# Patient Record
Sex: FEMALE
[De-identification: names, ages, dates, MRNs, and addresses within clinical notes are randomized per-mention and may not be internally consistent; named-entity substitution may affect disease eponyms.]

---

## 2022-06-19 ENCOUNTER — NURSE TRIAGE (OUTPATIENT)
Dept: OTHER | Facility: CLINIC | Age: 67
End: 2022-06-19

## 2022-06-19 NOTE — TELEPHONE ENCOUNTER
Subjective: Caller states \"Abdominal Pain\"     Current Symptoms:   Left sided abdominal pain- at the level of the umbilicus  Pain worse with standing and walking  Denies N/V/D/Constipation/Urinary s/sx  Fatigue    Hx IBS but states this pain is different than her usual pain. Onset: Yesterday    Pain Severity: 2/10; dull; constant    Temperature: Denies    What has been tried: Pepcid    Recommended disposition: See HCP within 4 Hours (or PCP triage)    Care advice provided, patient verbalizes understanding; denies any other questions or concerns; instructed to call back for any new or worsening symptoms. Patient/caller agrees to proceed to nearest [facility]     This triage is a result of a call to [address] [place] [address]. Please do not respond to the triage nurse through this encounter. Any subsequent communication should be directly with the patient.     Reason for Disposition   [1] MILD-MODERATE pain AND [2] constant AND [3] present > 2 hours    Protocols used: ABDOMINAL PAIN - [facility] - SHEMAR CATALAN

## 2023-07-28 ENCOUNTER — NURSE TRIAGE (OUTPATIENT)
Dept: OTHER | Facility: CLINIC | Age: 68
End: 2023-07-28

## 2023-07-28 NOTE — TELEPHONE ENCOUNTER
Pt called from [place]. Call transferred to Pavan Dickey RN to complete triage    Reason for Disposition   Caller has already spoken with another triager or PCP (or office), and has further questions and triager able to answer questions.     Protocols used: No Contact or Duplicate Contact Call-ADULT-[place]

## 2023-07-28 NOTE — TELEPHONE ENCOUNTER
Location of patient: [place]    Subjective: Caller states \"I'm on an Anti malaria pill x 2 weeks and I'm having severe nausea\"     Current Symptoms:   Malarone rx to be started yesterday (pt traveling today) x 1 week while there and 1 week after getting home    Pt has taken 1 dose at 9pm last night, better than it was  Pt was advised to drink water, take with fatty foods. Nausea, feeling unwell, reduced activity. Inquiring about what to do should nausea continue. Hesitant to take next dose this evening. [facility] rx medication. Onset: Nausea began this morning, mildly improved    Associated Symptoms: reduced activity    Pain Severity: 0/10    Temperature: Denies    What has been tried: Took with food, hydration, pepto    LMP: NA Pregnant: NA    Recommended disposition:  Pt advised to call [facility] to discuss symptoms with prescribing provider. Care advice provided, patient verbalizes understanding; denies any other questions or concerns; instructed to call back for any new or worsening symptoms. Pt agreeable to follow-up with prescribing clinic. This triage is a result of a call to [address] [place] [address]. Please do not respond to the triage nurse through this encounter. Any subsequent communication should be directly with the patient.   Reason for Disposition   Caller has medication question about med NOT prescribed by PCP and triager unable to answer question (e.g., compatibility with other med, storage)    Protocols used: Medication Question Call-ADULT-[place]

## 2023-08-14 ENCOUNTER — OFFICE VISIT (OUTPATIENT)
Dept: PRIMARY CARE | Facility: CLINIC | Age: 68
End: 2023-08-14
Payer: COMMERCIAL

## 2023-08-14 VITALS
WEIGHT: 129 LBS | DIASTOLIC BLOOD PRESSURE: 68 MMHG | TEMPERATURE: 98.3 F | SYSTOLIC BLOOD PRESSURE: 118 MMHG | BODY MASS INDEX: 19.55 KG/M2 | HEART RATE: 82 BPM | OXYGEN SATURATION: 98 % | HEIGHT: 68 IN

## 2023-08-14 DIAGNOSIS — E78.00 HYPERCHOLESTEREMIA: Primary | ICD-10-CM

## 2023-08-14 DIAGNOSIS — I48.91 ATRIAL FIBRILLATION, CONTROLLED (MULTI): ICD-10-CM

## 2023-08-14 DIAGNOSIS — R73.03 PREDIABETES: ICD-10-CM

## 2023-08-14 DIAGNOSIS — N18.31 STAGE 3A CHRONIC KIDNEY DISEASE (MULTI): ICD-10-CM

## 2023-08-14 PROBLEM — A60.00 GENITAL HERPES: Status: ACTIVE | Noted: 2023-08-14

## 2023-08-14 PROBLEM — H52.13 HIGH MYOPIA, BILATERAL: Status: ACTIVE | Noted: 2023-08-14

## 2023-08-14 PROBLEM — N95.2 ATROPHIC VAGINITIS: Status: ACTIVE | Noted: 2023-08-14

## 2023-08-14 PROBLEM — N18.9 CKD (CHRONIC KIDNEY DISEASE): Status: ACTIVE | Noted: 2023-08-14

## 2023-08-14 PROBLEM — H40.003 GLAUCOMA SUSPECT OF BOTH EYES: Status: ACTIVE | Noted: 2023-08-14

## 2023-08-14 PROBLEM — H25.11 AGE-RELATED NUCLEAR CATARACT OF RIGHT EYE: Status: ACTIVE | Noted: 2023-08-14

## 2023-08-14 PROBLEM — H47.20 BILATERAL OPTIC NERVE ATROPHY: Status: ACTIVE | Noted: 2023-08-14

## 2023-08-14 PROBLEM — F41.9 ANXIETY: Status: ACTIVE | Noted: 2023-08-14

## 2023-08-14 PROBLEM — D64.9 ANEMIA: Status: ACTIVE | Noted: 2023-08-14

## 2023-08-14 PROBLEM — K58.9 IRRITABLE BOWEL SYNDROME: Status: ACTIVE | Noted: 2023-08-14

## 2023-08-14 PROBLEM — M19.90 ARTHRITIS: Status: ACTIVE | Noted: 2023-08-14

## 2023-08-14 PROBLEM — E55.9 VITAMIN D DEFICIENCY: Status: ACTIVE | Noted: 2023-08-14

## 2023-08-14 PROBLEM — E53.8 B12 DEFICIENCY: Status: ACTIVE | Noted: 2023-08-14

## 2023-08-14 PROBLEM — H25.12 AGE-RELATED NUCLEAR CATARACT OF LEFT EYE: Status: ACTIVE | Noted: 2023-08-14

## 2023-08-14 PROCEDURE — 99214 OFFICE O/P EST MOD 30 MIN: CPT | Performed by: FAMILY MEDICINE

## 2023-08-14 PROCEDURE — 1036F TOBACCO NON-USER: CPT | Performed by: FAMILY MEDICINE

## 2023-08-14 PROCEDURE — 1160F RVW MEDS BY RX/DR IN RCRD: CPT | Performed by: FAMILY MEDICINE

## 2023-08-14 PROCEDURE — 1159F MED LIST DOCD IN RCRD: CPT | Performed by: FAMILY MEDICINE

## 2023-08-14 RX ORDER — DICYCLOMINE HYDROCHLORIDE 10 MG/1
CAPSULE ORAL
COMMUNITY
Start: 2022-07-19

## 2023-08-14 RX ORDER — AMITRIPTYLINE HYDROCHLORIDE 10 MG/1
1 TABLET, FILM COATED ORAL NIGHTLY
Qty: 30 TABLET | Refills: 2 | COMMUNITY
Start: 2023-07-14 | End: 2024-04-26 | Stop reason: SINTOL

## 2023-08-14 RX ORDER — ESTRADIOL 0.1 MG/G
CREAM VAGINAL
COMMUNITY

## 2023-08-14 RX ORDER — DILTIAZEM HYDROCHLORIDE 240 MG/1
240 CAPSULE, COATED, EXTENDED RELEASE ORAL DAILY
COMMUNITY
Start: 2023-05-22

## 2023-08-14 RX ORDER — RIVAROXABAN 20 MG/1
20 TABLET, FILM COATED ORAL
COMMUNITY

## 2023-08-14 RX ORDER — ACYCLOVIR 400 MG/1
TABLET ORAL
COMMUNITY
Start: 2018-01-24

## 2023-08-14 RX ORDER — ROSUVASTATIN CALCIUM 10 MG/1
10 TABLET, COATED ORAL DAILY
COMMUNITY

## 2023-08-14 RX ORDER — LATANOPROST 50 UG/ML
SOLUTION/ DROPS OPHTHALMIC
COMMUNITY

## 2023-08-14 RX ORDER — FAMOTIDINE 20 MG/1
1 TABLET, FILM COATED ORAL 2 TIMES DAILY
COMMUNITY
Start: 2022-06-03

## 2023-08-14 RX ORDER — HYOSCYAMINE SULFATE 0.12 MG/1
0.12 TABLET SUBLINGUAL EVERY 6 HOURS PRN
COMMUNITY
Start: 2023-07-14

## 2023-08-14 ASSESSMENT — PATIENT HEALTH QUESTIONNAIRE - PHQ9
1. LITTLE INTEREST OR PLEASURE IN DOING THINGS: NOT AT ALL
2. FEELING DOWN, DEPRESSED OR HOPELESS: NOT AT ALL
SUM OF ALL RESPONSES TO PHQ9 QUESTIONS 1 AND 2: 0

## 2023-08-14 NOTE — PROGRESS NOTES
Subjective   Patient ID: Emma Bansal is a 68 y.o. female who presents for Follow-up (6 month).    HPI   Follow-up multiple medical problems  ** care team **  She follows with many specialists most at [facility] [place]  OB/GYN -Dr. Osborn on imvexxy for dryness - helping   GI Dr. Quiroz for her IBS and colonoscopies -done 10/22, getting on elavil  has been very helpful in the past for IBS  Nephrology - was seen for CKD- told to follow up with PCP- if stable no need for further follow up  neurology- Dr. Barraza - [facility]-following for benign meningioma  Cardio-Dr. Armstrong(main)/ Dr. Barrera (EP)- [facility]- for a fib/flutter- well controlled/ rate controlled on xarelto  Ophtho- Dr. Whatley- [facility]- glaucoma specialist  urology- trace hematuria- went to urology- told will always have trace blood while on xarelto but is not problematic no further follow up or work up   Oculoplastics- had lid surgery       Cold sx since returning from [place] 1 week ago   Covid neg x 2  Started with fevers  Sinus congestion 'better today actually     a fib s/p cardioversion 2019- following with [facility]  had Ziopatch- no issues by pt report  Occasional palpitations but otherwise doing well- never fully goes into a fib anymore   no bleeding issues on xarelto      Hyperlipidemiaâ€“tolerating statin well without any issues  No chest pain confirms breath, nausea, vomiting, headaches, dizziness  due for labs      CKD3- labs due       prediabetes- stable on recent labs. tends toward sugar   eating overall healthy but does tend toward carbs when IBS flares   exercising regularly     mild anemia and low wbc in past- per pt always this way- sister and dad same  no bleeding issues   is on xarelto           HM  s/p hysterectomy benign reasons   colonoscopy done 10/22- due 10/27  mammogram due 8/23    Review of Systems   All other systems reviewed and are negative.      Objective   /68 (BP Location: Right arm, Patient Position: Sitting)   Pulse 82   Temp  "36.8 °C (98.3 °F) (Temporal)   Ht 1.715 m (5' 7.5\")   Wt 58.5 kg (129 lb)   SpO2 98%   BMI 19.91 kg/m²     Physical Exam  Vitals and nursing note reviewed.   Constitutional:       Appearance: Normal appearance.   HENT:      Head: Normocephalic and atraumatic.      Right Ear: Tympanic membrane, ear canal and external ear normal.      Left Ear: Tympanic membrane, ear canal and external ear normal.      Nose: Nose normal.      Mouth/Throat:      Mouth: Mucous membranes are moist.      Pharynx: Oropharynx is clear.   Eyes:      Extraocular Movements: Extraocular movements intact.      Conjunctiva/sclera: Conjunctivae normal.      Pupils: Pupils are equal, round, and reactive to light.   Cardiovascular:      Rate and Rhythm: Normal rate and regular rhythm.      Pulses: Normal pulses.      Heart sounds: Normal heart sounds. No murmur heard.     No friction rub. No gallop.   Pulmonary:      Effort: Pulmonary effort is normal. No respiratory distress.      Breath sounds: Normal breath sounds.   Abdominal:      General: Abdomen is flat. Bowel sounds are normal.      Palpations: Abdomen is soft. There is no mass.      Tenderness: There is no abdominal tenderness. There is no rebound.   Musculoskeletal:         General: No swelling or deformity. Normal range of motion.      Cervical back: Normal range of motion and neck supple.   Lymphadenopathy:      Cervical: No cervical adenopathy.   Skin:     General: Skin is warm and dry.      Capillary Refill: Capillary refill takes less than 2 seconds.      Findings: No rash.   Neurological:      General: No focal deficit present.      Mental Status: She is alert and oriented to person, place, and time. Mental status is at baseline.      Cranial Nerves: No cranial nerve deficit.      Gait: Gait normal.      Deep Tendon Reflexes: Reflexes normal.   Psychiatric:         Mood and Affect: Mood normal.         Behavior: Behavior normal.         Thought Content: Thought content normal.    "      Judgment: Judgment normal.         Assessment/Plan   1. a fib- well controlled. no bleeding issues. following closely with cardio. no sx   2. meningioma- stable on mri- following yearly with neruo. no sx. f/u mri by neuro  3. hyperlipidemia- - tolerating statin. no issues. Labs due   4. Prediabetes-continue work on healthy diet and exercise and recheck in 6 months  5. Ckd =stable labs due     Uri improving call inb           -PT verbalized understanding of plan of care and all questions were answered    Randy Sauer, DO

## 2023-11-09 ENCOUNTER — TELEPHONE (OUTPATIENT)
Dept: PRIMARY CARE | Facility: CLINIC | Age: 68
End: 2023-11-09
Payer: COMMERCIAL

## 2023-11-09 DIAGNOSIS — Z71.84 TRAVEL ADVICE ENCOUNTER: Primary | ICD-10-CM

## 2023-11-09 NOTE — TELEPHONE ENCOUNTER
Patient will be traveling out of the country soon and would like to know if she could get a preventative rx for travelers diarrhea?     Karl romero

## 2023-11-10 RX ORDER — AZITHROMYCIN 500 MG/1
500 TABLET, FILM COATED ORAL DAILY
Qty: 3 TABLET | Refills: 0 | Status: SHIPPED | OUTPATIENT
Start: 2023-11-10 | End: 2023-11-13

## 2023-11-10 NOTE — TELEPHONE ENCOUNTER
Can she gregoria azithromycin? That is what we use and she has erythromycin as an allergy? Limited with her allergies

## 2024-02-19 ENCOUNTER — TELEPHONE (OUTPATIENT)
Dept: PRIMARY CARE | Facility: CLINIC | Age: 69
End: 2024-02-19
Payer: COMMERCIAL

## 2024-02-19 NOTE — TELEPHONE ENCOUNTER
Patient had to reschedule appt, she's now scheduled for 4/26. She wasn't sure if she's due to have labs prior to that appt. Please advise

## 2024-02-23 ENCOUNTER — APPOINTMENT (OUTPATIENT)
Dept: PRIMARY CARE | Facility: CLINIC | Age: 69
End: 2024-02-23
Payer: COMMERCIAL

## 2024-04-23 ENCOUNTER — LAB (OUTPATIENT)
Dept: LAB | Facility: LAB | Age: 69
End: 2024-04-23
Payer: COMMERCIAL

## 2024-04-23 DIAGNOSIS — E78.00 HYPERCHOLESTEREMIA: ICD-10-CM

## 2024-04-23 DIAGNOSIS — N18.31 STAGE 3A CHRONIC KIDNEY DISEASE (MULTI): ICD-10-CM

## 2024-04-23 DIAGNOSIS — R73.03 PREDIABETES: ICD-10-CM

## 2024-04-23 DIAGNOSIS — I48.91 ATRIAL FIBRILLATION, CONTROLLED (MULTI): ICD-10-CM

## 2024-04-23 LAB
ALBUMIN SERPL BCP-MCNC: 4 G/DL (ref 3.4–5)
ALP SERPL-CCNC: 59 U/L (ref 33–136)
ALT SERPL W P-5'-P-CCNC: 12 U/L (ref 7–45)
ANION GAP SERPL CALC-SCNC: 13 MMOL/L (ref 10–20)
AST SERPL W P-5'-P-CCNC: 18 U/L (ref 9–39)
BASOPHILS # BLD AUTO: 0.01 X10*3/UL (ref 0–0.1)
BASOPHILS NFR BLD AUTO: 0.2 %
BILIRUB SERPL-MCNC: 0.4 MG/DL (ref 0–1.2)
BUN SERPL-MCNC: 16 MG/DL (ref 6–23)
CALCIUM SERPL-MCNC: 8.9 MG/DL (ref 8.6–10.3)
CHLORIDE SERPL-SCNC: 105 MMOL/L (ref 98–107)
CHOLEST SERPL-MCNC: 186 MG/DL (ref 0–199)
CHOLESTEROL/HDL RATIO: 2.1
CO2 SERPL-SCNC: 27 MMOL/L (ref 21–32)
CREAT SERPL-MCNC: 0.95 MG/DL (ref 0.5–1.05)
EGFRCR SERPLBLD CKD-EPI 2021: 65 ML/MIN/1.73M*2
EOSINOPHIL # BLD AUTO: 0 X10*3/UL (ref 0–0.7)
EOSINOPHIL NFR BLD AUTO: 0 %
ERYTHROCYTE [DISTWIDTH] IN BLOOD BY AUTOMATED COUNT: 13.2 % (ref 11.5–14.5)
GLUCOSE SERPL-MCNC: 78 MG/DL (ref 74–99)
HCT VFR BLD AUTO: 38.3 % (ref 36–46)
HDLC SERPL-MCNC: 87.6 MG/DL
HGB BLD-MCNC: 12.1 G/DL (ref 12–16)
IMM GRANULOCYTES # BLD AUTO: 0 X10*3/UL (ref 0–0.7)
IMM GRANULOCYTES NFR BLD AUTO: 0 % (ref 0–0.9)
LDLC SERPL CALC-MCNC: 86 MG/DL
LYMPHOCYTES # BLD AUTO: 1.47 X10*3/UL (ref 1.2–4.8)
LYMPHOCYTES NFR BLD AUTO: 28.9 %
MCH RBC QN AUTO: 29.6 PG (ref 26–34)
MCHC RBC AUTO-ENTMCNC: 31.6 G/DL (ref 32–36)
MCV RBC AUTO: 94 FL (ref 80–100)
MONOCYTES # BLD AUTO: 0.61 X10*3/UL (ref 0.1–1)
MONOCYTES NFR BLD AUTO: 12 %
NEUTROPHILS # BLD AUTO: 2.99 X10*3/UL (ref 1.2–7.7)
NEUTROPHILS NFR BLD AUTO: 58.9 %
NON HDL CHOLESTEROL: 98 MG/DL (ref 0–149)
NRBC BLD-RTO: 0 /100 WBCS (ref 0–0)
PLATELET # BLD AUTO: 235 X10*3/UL (ref 150–450)
POTASSIUM SERPL-SCNC: 4.5 MMOL/L (ref 3.5–5.3)
PROT SERPL-MCNC: 6.6 G/DL (ref 6.4–8.2)
RBC # BLD AUTO: 4.09 X10*6/UL (ref 4–5.2)
SODIUM SERPL-SCNC: 140 MMOL/L (ref 136–145)
TRIGL SERPL-MCNC: 64 MG/DL (ref 0–149)
VLDL: 13 MG/DL (ref 0–40)
WBC # BLD AUTO: 5.1 X10*3/UL (ref 4.4–11.3)

## 2024-04-23 PROCEDURE — 83036 HEMOGLOBIN GLYCOSYLATED A1C: CPT

## 2024-04-23 PROCEDURE — 80053 COMPREHEN METABOLIC PANEL: CPT

## 2024-04-23 PROCEDURE — 85025 COMPLETE CBC W/AUTO DIFF WBC: CPT

## 2024-04-23 PROCEDURE — 80061 LIPID PANEL: CPT

## 2024-04-23 PROCEDURE — 36415 COLL VENOUS BLD VENIPUNCTURE: CPT

## 2024-04-24 LAB
EST. AVERAGE GLUCOSE BLD GHB EST-MCNC: 123 MG/DL
HBA1C MFR BLD: 5.9 %

## 2024-04-26 ENCOUNTER — OFFICE VISIT (OUTPATIENT)
Dept: PRIMARY CARE | Facility: CLINIC | Age: 69
End: 2024-04-26
Payer: COMMERCIAL

## 2024-04-26 VITALS
DIASTOLIC BLOOD PRESSURE: 60 MMHG | BODY MASS INDEX: 20.77 KG/M2 | WEIGHT: 134.6 LBS | SYSTOLIC BLOOD PRESSURE: 96 MMHG | TEMPERATURE: 98 F

## 2024-04-26 DIAGNOSIS — D32.9 BENIGN NEOPLASM OF MENINGES, UNSPECIFIED (MULTI): Primary | ICD-10-CM

## 2024-04-26 DIAGNOSIS — R73.03 PREDIABETES: ICD-10-CM

## 2024-04-26 DIAGNOSIS — I48.91 ATRIAL FIBRILLATION, CONTROLLED (MULTI): ICD-10-CM

## 2024-04-26 DIAGNOSIS — E78.00 HYPERCHOLESTEREMIA: ICD-10-CM

## 2024-04-26 DIAGNOSIS — N18.31 STAGE 3A CHRONIC KIDNEY DISEASE (MULTI): ICD-10-CM

## 2024-04-26 PROCEDURE — 1160F RVW MEDS BY RX/DR IN RCRD: CPT | Performed by: FAMILY MEDICINE

## 2024-04-26 PROCEDURE — 1159F MED LIST DOCD IN RCRD: CPT | Performed by: FAMILY MEDICINE

## 2024-04-26 PROCEDURE — 99214 OFFICE O/P EST MOD 30 MIN: CPT | Performed by: FAMILY MEDICINE

## 2024-04-26 PROCEDURE — 1036F TOBACCO NON-USER: CPT | Performed by: FAMILY MEDICINE

## 2024-04-26 RX ORDER — CHOLECALCIFEROL (VITAMIN D3) 50 MCG
2000 TABLET ORAL
COMMUNITY

## 2024-04-26 RX ORDER — LANOLIN ALCOHOL/MO/W.PET/CERES
2500 CREAM (GRAM) TOPICAL
COMMUNITY

## 2024-04-26 RX ORDER — TIMOLOL MALEATE 5 MG/ML
1 SOLUTION/ DROPS OPHTHALMIC
COMMUNITY
Start: 2024-03-26 | End: 2024-06-24

## 2024-04-26 ASSESSMENT — PATIENT HEALTH QUESTIONNAIRE - PHQ9
2. FEELING DOWN, DEPRESSED OR HOPELESS: NOT AT ALL
1. LITTLE INTEREST OR PLEASURE IN DOING THINGS: NOT AT ALL
SUM OF ALL RESPONSES TO PHQ9 QUESTIONS 1 AND 2: 0

## 2024-04-26 NOTE — PROGRESS NOTES
Subjective   Patient ID: Emma Bansal is a 69 y.o. female who presents for Follow-up.    HPI   Follow-up multiple medical problems  ** care team **  She follows with many specialists most at [facility]  OB/GYN -Dr. Osborn on imvexxy for dryness - helping   GI Dr. Quiroz for her IBS and colonoscopies -done 10/22,   Nephrology - was seen for CKD- told to follow up with PCP- if stable no need for further follow up  neurology- Dr. Barraza - [facility]-following for benign meningioma  Cardio-Dr. Armstrong(main)/ Dr. Barrera ()- [facility]- for a fib/flutter- well controlled/ rate controlled on xarelto  Ophtho- Dr. Whatley- [facility]- glaucoma specialist  urology- trace hematuria- went to urology- told will always have trace blood while on xarelto but is not problematic no further follow up or work up   Oculoplastics- had lid surgery    Stopped amitriptyline which was being used for IBS due to wt gain concerns   Walks daily - brisk and 14,000 steps for exercise     Of note seeing ortho in next month for knee issues    a fib s/p cardioversion 2019- following with [facility]  had Ziopatch- no issues by pt report  Occasional palpitations but otherwise doing well- never fully goes into a fib anymore but has had some episodes she doesn't feel normal fully. Called cardio about it and they told if worse ED if not wait until appt in a few months   no bleeding issues on xarelto      Hyperlipidemia-tolerating statin well without any issues  No chest pain confirms breath, nausea, vomiting, headaches, dizziness  due for labs      CKD3- labs due       prediabetes- as above tends toward sugar - diet was worse in last few months with stress   eating overall healthy but does tend toward carbs when IBS flares   exercising regularly     mild anemia and low wbc in past- per pt always this way- sister and dad same  no bleeding issues   is on xarelto           HM  s/p hysterectomy benign reasons   colonoscopy done 10/22- due 10/27  mammogram due  8/23    Review of Systems   All other systems reviewed and are negative.      Objective   BP 96/60   Temp 36.7 °C (98 °F)   Wt 61.1 kg (134 lb 9.6 oz)   BMI 20.77 kg/m²     Physical Exam  Vitals and nursing note reviewed.   Constitutional:       Appearance: Normal appearance.   HENT:      Head: Normocephalic and atraumatic.      Right Ear: Tympanic membrane, ear canal and external ear normal.      Left Ear: Tympanic membrane, ear canal and external ear normal.      Nose: Nose normal.      Mouth/Throat:      Mouth: Mucous membranes are moist.      Pharynx: Oropharynx is clear.   Eyes:      Extraocular Movements: Extraocular movements intact.      Conjunctiva/sclera: Conjunctivae normal.      Pupils: Pupils are equal, round, and reactive to light.   Cardiovascular:      Rate and Rhythm: Normal rate and regular rhythm.      Pulses: Normal pulses.      Heart sounds: Normal heart sounds. No murmur heard.     No friction rub. No gallop.   Pulmonary:      Effort: Pulmonary effort is normal. No respiratory distress.      Breath sounds: Normal breath sounds.   Abdominal:      General: Abdomen is flat. Bowel sounds are normal.      Palpations: Abdomen is soft. There is no mass.      Tenderness: There is no abdominal tenderness. There is no rebound.   Musculoskeletal:         General: No swelling or deformity. Normal range of motion.      Cervical back: Normal range of motion and neck supple.   Lymphadenopathy:      Cervical: No cervical adenopathy.   Skin:     General: Skin is warm and dry.      Capillary Refill: Capillary refill takes less than 2 seconds.      Findings: No rash.   Neurological:      General: No focal deficit present.      Mental Status: She is alert and oriented to person, place, and time. Mental status is at baseline.      Cranial Nerves: No cranial nerve deficit.      Gait: Gait normal.      Deep Tendon Reflexes: Reflexes normal.   Psychiatric:         Mood and Affect: Mood normal.         Behavior:  Behavior normal.         Thought Content: Thought content normal.         Judgment: Judgment normal.       Assessment/Plan     Reveiwed recent labs   1. a fib- well controlled. no bleeding issues. following closely with cardio. no sx   2. meningioma- stable on mri- following yearly with neruo. no sx. f/u mri by neuro  3. hyperlipidemia- - tolerating statin. no issues. Labs due   4. Prediabetes-worse continue work on healthy diet and exercise and recheck in 6 months  5. Ckd =stable labs due   6. Ibs- off elavil. Fu prn based on sx           -PT verbalized understanding of plan of care and all questions were answered    Randy Sauer, DO

## 2024-10-28 ENCOUNTER — APPOINTMENT (OUTPATIENT)
Dept: PRIMARY CARE | Facility: CLINIC | Age: 69
End: 2024-10-28
Payer: COMMERCIAL

## 2024-10-28 VITALS
DIASTOLIC BLOOD PRESSURE: 60 MMHG | TEMPERATURE: 98 F | BODY MASS INDEX: 20.65 KG/M2 | SYSTOLIC BLOOD PRESSURE: 92 MMHG | WEIGHT: 133.8 LBS

## 2024-10-28 DIAGNOSIS — E78.00 HYPERCHOLESTEREMIA: ICD-10-CM

## 2024-10-28 DIAGNOSIS — I48.91 ATRIAL FIBRILLATION, CONTROLLED (MULTI): ICD-10-CM

## 2024-10-28 DIAGNOSIS — E53.8 B12 DEFICIENCY: ICD-10-CM

## 2024-10-28 DIAGNOSIS — R73.03 PREDIABETES: ICD-10-CM

## 2024-10-28 DIAGNOSIS — N18.31 STAGE 3A CHRONIC KIDNEY DISEASE (MULTI): Primary | ICD-10-CM

## 2024-10-28 LAB — POC HEMOGLOBIN A1C: 5.8 % (ref 4.2–6.5)

## 2024-10-28 PROCEDURE — 83036 HEMOGLOBIN GLYCOSYLATED A1C: CPT | Performed by: FAMILY MEDICINE

## 2024-10-28 PROCEDURE — 1158F ADVNC CARE PLAN TLK DOCD: CPT | Performed by: FAMILY MEDICINE

## 2024-10-28 PROCEDURE — 1123F ACP DISCUSS/DSCN MKR DOCD: CPT | Performed by: FAMILY MEDICINE

## 2024-10-28 PROCEDURE — 1160F RVW MEDS BY RX/DR IN RCRD: CPT | Performed by: FAMILY MEDICINE

## 2024-10-28 PROCEDURE — 1159F MED LIST DOCD IN RCRD: CPT | Performed by: FAMILY MEDICINE

## 2024-10-28 PROCEDURE — 99214 OFFICE O/P EST MOD 30 MIN: CPT | Performed by: FAMILY MEDICINE

## 2024-10-28 PROCEDURE — 1036F TOBACCO NON-USER: CPT | Performed by: FAMILY MEDICINE

## 2024-10-28 ASSESSMENT — PATIENT HEALTH QUESTIONNAIRE - PHQ9
SUM OF ALL RESPONSES TO PHQ9 QUESTIONS 1 AND 2: 0
1. LITTLE INTEREST OR PLEASURE IN DOING THINGS: NOT AT ALL
2. FEELING DOWN, DEPRESSED OR HOPELESS: NOT AT ALL

## 2025-04-17 ENCOUNTER — PATIENT OUTREACH (OUTPATIENT)
Dept: PRIMARY CARE | Facility: CLINIC | Age: 70
End: 2025-04-17
Payer: COMMERCIAL

## 2025-04-17 ENCOUNTER — TELEPHONE (OUTPATIENT)
Dept: PRIMARY CARE | Facility: CLINIC | Age: 70
End: 2025-04-17
Payer: COMMERCIAL

## 2025-04-17 RX ORDER — LIDOCAINE 560 MG/1
1 PATCH PERCUTANEOUS; TOPICAL; TRANSDERMAL
COMMUNITY
Start: 2025-04-17 | End: 2025-04-27

## 2025-04-17 RX ORDER — DILTIAZEM HYDROCHLORIDE 120 MG/1
CAPSULE, COATED, EXTENDED RELEASE ORAL
COMMUNITY
Start: 2025-04-16

## 2025-04-17 RX ORDER — ACETAMINOPHEN 500 MG
500 TABLET ORAL EVERY 6 HOURS PRN
COMMUNITY
Start: 2025-04-16

## 2025-04-17 RX ORDER — CYCLOBENZAPRINE HCL 5 MG
5 TABLET ORAL EVERY 8 HOURS PRN
COMMUNITY
Start: 2025-04-16

## 2025-04-17 NOTE — TELEPHONE ENCOUNTER
Patient called, ASHLEIGH, said that she spoke with a nurse this morning who was contacting our office about her hospital stay  - was in [facility] for a week, had an injury that resulted in a hematoma, then her blood counts dropped.  They sent her home with a change in her medication Diltiazem.  It was changed from 240 mg, to none, to then 30 mg every 4 hours.      Last night, when she got home, she was in the middle of that cycle, but they gave her a 120 mg SR.  She called her pharmacy and they told her it was okay to take, but today, she is having light headedness on standing, but is okay when lying down or sitting.      Is there an alternative or should she hold off from taking for a day?    Requests a call back.  Her # is [phone].

## 2025-04-17 NOTE — PROGRESS NOTES
Discharge Facility: [facility]   Discharge Diagnosis: Contusion of buttock, initial encounter; Debility   Admission Date: 4/11/2025   Discharge Date: 4/16/2025     PCP Appointment Date: 4/30-outside rec 14 day window to be seen, office notified  Specialist Appointment Date: TBD  Hospital Encounter and Summary Linked: Yes  Hospital Encounter   See discharge assessment below for further details  Wrap Up  Wrap Up Additional Comments: Successful outreach to the patient has been completed. The patient reports feeling well at home since discharge and denies experiencing any chest pain, shortness of breath. States she did have some dizziness this morning but believes this was due to taking her cardizem last night. Advised to check her BP daily and to call office if dizziness persists. We reviewed their new medications and any changes made. The patient confirmed that they have all the necessary supplies and resources at home, and they also have support from family and friends if needed. I emphasized the importance of follow-up appointments with both their primary care physician and specialists to assess treatment response. Patient asking for bloodwork in about 1 week to make sure her H&H are not dropping again. PCP notified. The patient is aware of my availability for non-emergency concerns and has been provided with my contact information. (4/17/2025 10:39 AM)    Engagement  Call Start Time: 1033 (4/17/2025 10:39 AM)    Medications  Medications reviewed with patient/caregiver?: Yes (new meds/changes discussed with patient) (4/17/2025 10:39 AM)  Is the patient having any side effects they believe may be caused by any medication additions or changes?: No (4/17/2025 10:39 AM)  Does the patient have all medications ordered at discharge?: Yes (4/17/2025 10:39 AM)  Care Management Interventions: No intervention needed (4/17/2025 10:39 AM)  Prescription Comments: see med list (tylenol; flexeril; lidocaine patch;  cardizem; xarelto) (4/17/2025 10:39 AM)  Is the patient taking all medications as directed (includes completed medication regime)?: Yes (4/17/2025 10:39 AM)  Care Management Interventions: Provided patient education (4/17/2025 10:39 AM)    Appointments  Does the patient have a primary care provider?: Yes (4/17/2025 10:39 AM)  Care Management Interventions: Educated patient on importance of making appointment; Advised patient to make appointment (4/17/2025 10:39 AM)  Has the patient kept scheduled appointments due by today?: Yes (4/17/2025 10:39 AM)    Self Management  What is the home health agency?: denies need (4/17/2025 10:39 AM)  What Durable Medical Equipment (DME) was ordered?: n/a (4/17/2025 10:39 AM)    Patient Teaching  Does the patient have access to their discharge instructions?: Yes (4/17/2025 10:39 AM)  Care Management Interventions: Reviewed instructions with patient (4/17/2025 10:39 AM)  What is the patient's perception of their health status since discharge?: Improving (4/17/2025 10:39 AM)  Is the patient/caregiver able to teach back the hierarchy of who to call/visit for symptoms/problems? PCP, Specialist, Home Health nurse, Urgent Care, ED, 911: Yes (4/17/2025 10:39 AM)

## 2025-04-18 NOTE — TELEPHONE ENCOUNTER
Patient called this morning to follow up. She did hold last nights dose of Diltazem. She states she feels a little better. She is still getting light headed when she gets up and bp is still dropping. Today it dropped to 90/53 but she can sit up more and her pulse is 113 which isn't bad for her. She feels as though she doesn't need the medication and would like to know if she can hold another dose.     I called and spoke with Dr. Sauer and she states if patient bp is still relatively low and she is still light headed then she can hold one more dose. But she really should follow up with Cardio.     Spoke with patient, she verbalized understanding. She wanted to know if you want any labs done since she lost a lot of blood and had to get a blood transfusion? Please advise. She is going to reach out to cardio this morning

## 2025-04-28 ENCOUNTER — APPOINTMENT (OUTPATIENT)
Dept: PRIMARY CARE | Facility: CLINIC | Age: 70
End: 2025-04-28
Payer: COMMERCIAL

## 2025-04-28 VITALS
SYSTOLIC BLOOD PRESSURE: 100 MMHG | BODY MASS INDEX: 20.06 KG/M2 | DIASTOLIC BLOOD PRESSURE: 70 MMHG | OXYGEN SATURATION: 98 % | WEIGHT: 130 LBS | TEMPERATURE: 98.3 F | HEART RATE: 77 BPM

## 2025-04-28 DIAGNOSIS — D62 ANEMIA DUE TO ACUTE BLOOD LOSS: Primary | ICD-10-CM

## 2025-04-28 DIAGNOSIS — S70.01XS HEMATOMA OF RIGHT HIP, SEQUELA: ICD-10-CM

## 2025-04-28 DIAGNOSIS — I48.91 ATRIAL FIBRILLATION, CONTROLLED (MULTI): ICD-10-CM

## 2025-04-28 PROCEDURE — 1160F RVW MEDS BY RX/DR IN RCRD: CPT | Performed by: FAMILY MEDICINE

## 2025-04-28 PROCEDURE — 99495 TRANSJ CARE MGMT MOD F2F 14D: CPT | Performed by: FAMILY MEDICINE

## 2025-04-28 PROCEDURE — 1159F MED LIST DOCD IN RCRD: CPT | Performed by: FAMILY MEDICINE

## 2025-04-28 PROCEDURE — 1123F ACP DISCUSS/DSCN MKR DOCD: CPT | Performed by: FAMILY MEDICINE

## 2025-04-28 NOTE — PROGRESS NOTES
"Patient: Emma Bansal  : 1955  PCP: Randy Sauer DO  MRN: [id number]  Program: Transitional Care Management  Status: Enrolled  Effective Dates: 2025 - present  Responsible Staff: Cristino Cooper RN  Social Drivers to be Addressed: No information to display         Emma Bansal is a 70 y.o. female presenting today for follow-up after being discharged from the hospital 12 days ago. The main problem requiring admission was hematoma of the right buttock with subsequent anemia requiring 1 unit packed RBC. The discharge summary and/or Transitional Care Management documentation was reviewed. Medication reconciliation was performed as indicated via the \"Chaz as Reviewed\" timestamp.     Emma Bansal was contacted by Transitional Care Management services two days after her discharge. This encounter and supporting documentation was reviewed.    At this point she is feeling significantly improved  She is not having any pain from her hematoma  Off cardizem due to low BP sx  Seeing hematology wed to determine next stpes with r/s xarelto   She is somewhat fatigued and feels like she is having a harder time doing activities of daily life  She is otherwise feeling well and has no other significant symptoms at this point    Review of Systems   Constitutional:  Positive for fatigue. Negative for activity change and appetite change.   HENT:  Negative for congestion, postnasal drip and sinus pressure.    Eyes:  Negative for pain and visual disturbance.   Respiratory:  Negative for cough and shortness of breath.    Cardiovascular:  Negative for chest pain, palpitations and leg swelling.   Gastrointestinal:  Negative for abdominal pain, blood in stool, constipation, diarrhea, nausea and vomiting.   Endocrine: Negative for cold intolerance and heat intolerance.   Genitourinary:  Negative for dysuria and menstrual problem.   Musculoskeletal:  Negative for arthralgias and joint swelling.   Skin:  " Negative for rash and wound.   Neurological:  Negative for dizziness, light-headedness and headaches.   Psychiatric/Behavioral:  Negative for dysphoric mood and sleep disturbance. The patient is not nervous/anxious.        /70   Temp 36.8 °C (98.3 °F)   Wt 59 kg (130 lb)   BMI 20.06 kg/m²     Physical Exam  Vitals and nursing note reviewed.   Constitutional:       Appearance: Normal appearance. She is normal weight.   HENT:      Head: Normocephalic and atraumatic.      Right Ear: External ear normal.      Left Ear: External ear normal.      Mouth/Throat:      Mouth: Mucous membranes are moist.   Eyes:      Conjunctiva/sclera: Conjunctivae normal.   Cardiovascular:      Rate and Rhythm: Normal rate and regular rhythm.      Heart sounds: Normal heart sounds.   Pulmonary:      Effort: Pulmonary effort is normal.      Breath sounds: Normal breath sounds.   Musculoskeletal:         General: No swelling.      Cervical back: Normal range of motion and neck supple.   Skin:     General: Skin is warm and dry.      Capillary Refill: Capillary refill takes less than 2 seconds.   Neurological:      General: No focal deficit present.      Mental Status: She is alert. Mental status is at baseline.   Psychiatric:         Mood and Affect: Mood normal.         Behavior: Behavior normal.         Thought Content: Thought content normal.         Judgment: Judgment normal.         The complexity of medical decision making for this patient's transitional care is moderate.    Assessment/Plan   Problem List Items Addressed This Visit           ICD-10-CM    Anemia - Primary D64.9    Atrial fibrillation, controlled (Multi) I48.91     Other Visit Diagnoses         Codes      Hematoma of right hip, sequela     S70.01XS          Discussed with patient we will follow-up after we see the result of her hemoglobin.  She should continue to work with cardiology to determine neck steps moving forward for her diltiazem and Blood thinners  She  will keep her next scheduled visit with me unless concerns sooner

## 2025-04-30 ENCOUNTER — APPOINTMENT (OUTPATIENT)
Dept: PRIMARY CARE | Facility: CLINIC | Age: 70
End: 2025-04-30
Payer: COMMERCIAL

## 2025-05-01 ENCOUNTER — PATIENT OUTREACH (OUTPATIENT)
Dept: PRIMARY CARE | Facility: CLINIC | Age: 70
End: 2025-05-01
Payer: COMMERCIAL

## 2025-05-01 NOTE — PROGRESS NOTES
"Confirmation of at least 2 patient identifiers.    Completed telephonic follow-up with patient after recent visit with Dr. Sauer    Spoke to patient during outreach call.    Patient reports feeling: Improved    Patient has questions or concerns about medications: No    Have all prescribed medications been filled? Yes    Patient has necessary resources to manage their care? Yes    Patient has questions or concerns? No  Patient states today has been one of the first days she has been feeling better and does not feel like she is \"walking through a fog\". States she has been having an issue with insurance not covering her iron supplement but states this is not really an issue as they already ordered a different medication and she is waiting on pharmacy to let her know how much this will be.  Next care management follow-up approximately within one month.  Care  information provided to patient.       "

## 2025-05-04 ASSESSMENT — ENCOUNTER SYMPTOMS
DYSURIA: 0
FATIGUE: 1
JOINT SWELLING: 0
CONSTIPATION: 0
SHORTNESS OF BREATH: 0
SINUS PRESSURE: 0
COUGH: 0
BLOOD IN STOOL: 0
NAUSEA: 0
HEADACHES: 0
VOMITING: 0
ARTHRALGIAS: 0
EYE PAIN: 0
APPETITE CHANGE: 0
SLEEP DISTURBANCE: 0
ACTIVITY CHANGE: 0
DIZZINESS: 0
PALPITATIONS: 0
LIGHT-HEADEDNESS: 0
DYSPHORIC MOOD: 0
WOUND: 0
NERVOUS/ANXIOUS: 0
DIARRHEA: 0
ABDOMINAL PAIN: 0

## 2025-07-07 DIAGNOSIS — R73.03 PREDIABETES: ICD-10-CM

## 2025-07-07 DIAGNOSIS — E55.9 VITAMIN D DEFICIENCY: ICD-10-CM

## 2025-07-07 DIAGNOSIS — E53.8 B12 DEFICIENCY: ICD-10-CM

## 2025-07-07 DIAGNOSIS — D62 ANEMIA DUE TO ACUTE BLOOD LOSS: Primary | ICD-10-CM

## 2025-07-07 DIAGNOSIS — N18.31 STAGE 3A CHRONIC KIDNEY DISEASE (MULTI): ICD-10-CM

## 2025-07-07 DIAGNOSIS — I48.91 ATRIAL FIBRILLATION, CONTROLLED (MULTI): ICD-10-CM

## 2025-09-17 ENCOUNTER — APPOINTMENT (OUTPATIENT)
Dept: PRIMARY CARE | Facility: CLINIC | Age: 70
End: 2025-09-17
Payer: COMMERCIAL

## 2025-09-25 ENCOUNTER — APPOINTMENT (OUTPATIENT)
Dept: PRIMARY CARE | Facility: CLINIC | Age: 70
End: 2025-09-25
Payer: COMMERCIAL